# Patient Record
Sex: FEMALE | Race: BLACK OR AFRICAN AMERICAN | NOT HISPANIC OR LATINO | Employment: UNEMPLOYED | URBAN - METROPOLITAN AREA
[De-identification: names, ages, dates, MRNs, and addresses within clinical notes are randomized per-mention and may not be internally consistent; named-entity substitution may affect disease eponyms.]

---

## 2023-01-11 DIAGNOSIS — N62 MACROMASTIA: Primary | ICD-10-CM

## 2023-01-23 ENCOUNTER — OFFICE VISIT (OUTPATIENT)
Dept: PLASTIC SURGERY | Facility: CLINIC | Age: 28
End: 2023-01-23
Payer: MEDICAID

## 2023-01-23 VITALS
BODY MASS INDEX: 30.95 KG/M2 | HEART RATE: 76 BPM | SYSTOLIC BLOOD PRESSURE: 131 MMHG | WEIGHT: 168.19 LBS | DIASTOLIC BLOOD PRESSURE: 87 MMHG | HEIGHT: 62 IN | TEMPERATURE: 98 F | RESPIRATION RATE: 18 BRPM | OXYGEN SATURATION: 100 %

## 2023-01-23 DIAGNOSIS — N62 MACROMASTIA: ICD-10-CM

## 2023-01-23 PROCEDURE — 99214 OFFICE O/P EST MOD 30 MIN: CPT | Mod: PBBFAC | Performed by: SURGERY

## 2023-01-23 PROCEDURE — 99203 OFFICE O/P NEW LOW 30 MIN: CPT | Mod: S$PBB,,, | Performed by: SURGERY

## 2023-01-23 PROCEDURE — 3008F PR BODY MASS INDEX (BMI) DOCUMENTED: ICD-10-PCS | Mod: CPTII,,, | Performed by: SURGERY

## 2023-01-23 PROCEDURE — 3075F PR MOST RECENT SYSTOLIC BLOOD PRESS GE 130-139MM HG: ICD-10-PCS | Mod: CPTII,,, | Performed by: SURGERY

## 2023-01-23 PROCEDURE — 3079F PR MOST RECENT DIASTOLIC BLOOD PRESSURE 80-89 MM HG: ICD-10-PCS | Mod: CPTII,,, | Performed by: SURGERY

## 2023-01-23 PROCEDURE — 3079F DIAST BP 80-89 MM HG: CPT | Mod: CPTII,,, | Performed by: SURGERY

## 2023-01-23 PROCEDURE — 1159F PR MEDICATION LIST DOCUMENTED IN MEDICAL RECORD: ICD-10-PCS | Mod: CPTII,,, | Performed by: SURGERY

## 2023-01-23 PROCEDURE — 3008F BODY MASS INDEX DOCD: CPT | Mod: CPTII,,, | Performed by: SURGERY

## 2023-01-23 PROCEDURE — 3075F SYST BP GE 130 - 139MM HG: CPT | Mod: CPTII,,, | Performed by: SURGERY

## 2023-01-23 PROCEDURE — 1159F MED LIST DOCD IN RCRD: CPT | Mod: CPTII,,, | Performed by: SURGERY

## 2023-01-23 PROCEDURE — 99203 PR OFFICE/OUTPT VISIT, NEW, LEVL III, 30-44 MIN: ICD-10-PCS | Mod: S$PBB,,, | Performed by: SURGERY

## 2023-01-23 NOTE — LETTER
January 23, 2023      [facility]  [address] [address]  Phone: [phone]       Patient: Jaron Reynoso   YOB: 1995  Date of Visit: 01/23/2023    To Whom It May Concern:    Leslee Reynoso  was at [facility] on 01/23/2023. The patient may return to work/school on 01/24/2023 with no restrictions. If you have any questions or concerns, or if I can be of further assistance, please do not hesitate to contact me.    Sincerely,    Kiana Soni RN

## 2023-01-23 NOTE — PROGRESS NOTES
Pt seen by Dr. Brady; Pt instructed to lose about 10 more pounds to qualify for surgery; Pt instructed to return to clinic in 2 months for weight loss check; Discharge paperwork given w/pt verbalizing understanding

## 2023-01-23 NOTE — PROGRESS NOTES
"Pt with a BMI of 30.76 with a height of 5'2" and weight of 168, due to these metrics, she would be denied by insurance for the procedure. Pt encouraged to loose 10 lbs to be at a BMI of <30.00. RTC in 2 months.     Dolores Rice M.D.  [facility] General Surgery, PGY-1  "

## 2023-01-23 NOTE — PROGRESS NOTES
\[facility]\"" General Surgery Clinic Note    HPI: Jaron Reynoso is a 27 y.o. female with PMHx of *** who presents today for evaluation of macromastia.  Bra size  Neck/back pain  Trapezium indention  Rashes under breast  Tried bras/meds?  6 months?  Worse when beding down  Affect ADLs?      PMH: History reviewed. No pertinent past medical history.   Meds: per chart review No current outpatient medications  Allergies: Review of patient's allergies indicates:  No Known Allergies  Social History:  Social History     Tobacco Use    Smoking status: Never    Smokeless tobacco: Never   Substance Use Topics    Alcohol use: Not Currently    Drug use: Never      Family History: per chart review History reviewed. No pertinent family history.  Surgical History: per chart review History reviewed. No pertinent surgical history.    Review of Systems:  Pertinent positives and negatives noted in HPI.  ROS     Objective:    Vitals:  Vitals:    01/23/23 1417   BP: 131/87   Pulse: 76   Resp: 18   Temp: 98 °F (36.7 °C)          Physical Exam:  Gen: NAD, well nourished  Neuro: awake, alert, answering questions appropriately  HEENT: EOM grossly intact; Anicteric sclera  CV: regular rhythm  Resp: non-labored breathing; lower lung fields clear bilaterally  Abd: soft, non-tender, non-distended  Ext: no edema in bilateral ankles, moves all 4 extremities spontaneously  Skin: no visible rashes  Psychiatric: cooperative; appropriate affect      Assessment/Plan:  Jaron Reynoso is a 27 y.o. female with PMHx of *** who presents today ***    - ***    Juan Mahan, MS3  \[facility]\"" General Surgery

## 2023-07-05 ENCOUNTER — PATIENT MESSAGE (OUTPATIENT)
Dept: RESEARCH | Facility: HOSPITAL | Age: 28
End: 2023-07-05
Payer: MEDICAID

## 2025-06-24 DIAGNOSIS — M25.561 CHRONIC PAIN OF RIGHT KNEE: Primary | ICD-10-CM

## 2025-06-24 DIAGNOSIS — G89.29 CHRONIC PAIN OF RIGHT KNEE: Primary | ICD-10-CM
